# Patient Record
Sex: FEMALE | Race: WHITE
[De-identification: names, ages, dates, MRNs, and addresses within clinical notes are randomized per-mention and may not be internally consistent; named-entity substitution may affect disease eponyms.]

---

## 2017-04-10 ENCOUNTER — HOSPITAL ENCOUNTER (OUTPATIENT)
Dept: HOSPITAL 79 - OPSV | Age: 67
End: 2017-04-10
Attending: FAMILY MEDICINE
Payer: MEDICARE

## 2017-04-10 DIAGNOSIS — D50.9: Primary | ICD-10-CM

## 2017-04-10 LAB
HGB BLD-MCNC: 7.3 GM/DL (ref 12.3–15.3)
RED BLOOD COUNT: 3.65 M/UL (ref 4–5.1)
WHITE BLOOD COUNT: 6.3 K/UL (ref 4.5–11)

## 2017-04-10 PROCEDURE — P9016 RBC LEUKOCYTES REDUCED: HCPCS

## 2017-04-11 ENCOUNTER — HOSPITAL ENCOUNTER (OUTPATIENT)
Dept: HOSPITAL 79 - LAB | Age: 67
End: 2017-04-11
Attending: NURSE PRACTITIONER
Payer: MEDICARE

## 2017-04-11 DIAGNOSIS — D50.9: Primary | ICD-10-CM

## 2017-04-11 LAB
HGB BLD-MCNC: 9.9 GM/DL (ref 12.3–15.3)
RED BLOOD COUNT: 4.44 M/UL (ref 4–5.1)
WHITE BLOOD COUNT: 7.4 K/UL (ref 4.5–11)

## 2017-04-24 ENCOUNTER — TELEPHONE (OUTPATIENT)
Dept: OBSTETRICS AND GYNECOLOGY | Facility: CLINIC | Age: 67
End: 2017-04-24

## 2017-04-24 NOTE — TELEPHONE ENCOUNTER
I think that is the med that she had been using with success in the past.  It makes me wonder if there is something new going on.  I would need to see her to look and see exactly what's going on to better figure out what we can do to help her with her itching.

## 2017-04-24 NOTE — TELEPHONE ENCOUNTER
----- Message from Xi Mcleod sent at 4/24/2017  9:38 AM EDT -----  TRIAMCINOLONE RX THAT WAS GIVEN IS USELESS FOR HER RASH, THE FIRST RX GIVEN WAS NOT COVERED BY HER INSURANCE .  CAN SHE HAVE SOMETHING ELSE THAT IS COVERED AND THAT WILL WORK.       [facility] IN [place]

## 2017-06-14 ENCOUNTER — HOSPITAL ENCOUNTER (OUTPATIENT)
Dept: HOSPITAL 79 - LAB | Age: 67
End: 2017-06-14
Attending: SURGERY
Payer: MEDICARE

## 2017-06-14 DIAGNOSIS — D64.9: Primary | ICD-10-CM

## 2017-06-14 LAB — HGB BLD-MCNC: 9.9 GM/DL (ref 12.3–15.3)

## 2017-11-29 ENCOUNTER — OFFICE VISIT (OUTPATIENT)
Dept: OBSTETRICS AND GYNECOLOGY | Facility: CLINIC | Age: 67
End: 2017-11-29

## 2017-11-29 VITALS
DIASTOLIC BLOOD PRESSURE: 76 MMHG | RESPIRATION RATE: 14 BRPM | SYSTOLIC BLOOD PRESSURE: 130 MMHG | HEIGHT: 61 IN | BODY MASS INDEX: 31.53 KG/M2 | WEIGHT: 167 LBS

## 2017-11-29 DIAGNOSIS — M85.862 OSTEOPENIA OF BOTH LOWER LEGS: ICD-10-CM

## 2017-11-29 DIAGNOSIS — M85.861 OSTEOPENIA OF BOTH LOWER LEGS: ICD-10-CM

## 2017-11-29 DIAGNOSIS — Z01.419 WELL WOMAN EXAM WITH ROUTINE GYNECOLOGICAL EXAM: Primary | ICD-10-CM

## 2017-11-29 PROBLEM — E11.9 DIABETES TYPE 2, CONTROLLED: Status: ACTIVE | Noted: 2017-01-01

## 2017-11-29 PROCEDURE — 99397 PER PM REEVAL EST PAT 65+ YR: CPT | Performed by: OBSTETRICS & GYNECOLOGY

## 2017-11-29 RX ORDER — DOXYCYCLINE HYCLATE 50 MG/1
324 CAPSULE, GELATIN COATED ORAL
COMMUNITY
End: 2019-12-09

## 2017-11-29 RX ORDER — RANITIDINE 150 MG/1
TABLET ORAL
COMMUNITY
Start: 2017-08-31 | End: 2017-11-29

## 2017-11-29 RX ORDER — LANOLIN ALCOHOL/MO/W.PET/CERES
1000 CREAM (GRAM) TOPICAL DAILY
COMMUNITY

## 2017-11-29 RX ORDER — MELATONIN
1000 DAILY
COMMUNITY
End: 2019-12-09
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
## 2017-12-05 ENCOUNTER — APPOINTMENT (OUTPATIENT)
Dept: BONE DENSITY | Facility: HOSPITAL | Age: 67
End: 2017-12-05
Attending: OBSTETRICS & GYNECOLOGY

## 2018-03-07 ENCOUNTER — APPOINTMENT (OUTPATIENT)
Dept: BONE DENSITY | Facility: HOSPITAL | Age: 68
End: 2018-03-07
Attending: OBSTETRICS & GYNECOLOGY

## 2018-04-02 ENCOUNTER — APPOINTMENT (OUTPATIENT)
Dept: BONE DENSITY | Facility: HOSPITAL | Age: 68
End: 2018-04-02
Attending: OBSTETRICS & GYNECOLOGY

## 2018-12-03 ENCOUNTER — OFFICE VISIT (OUTPATIENT)
Dept: OBSTETRICS AND GYNECOLOGY | Facility: CLINIC | Age: 68
End: 2018-12-03

## 2018-12-03 VITALS
DIASTOLIC BLOOD PRESSURE: 76 MMHG | BODY MASS INDEX: 32.88 KG/M2 | WEIGHT: 174 LBS | RESPIRATION RATE: 14 BRPM | SYSTOLIC BLOOD PRESSURE: 128 MMHG

## 2018-12-03 DIAGNOSIS — R15.9 INCONTINENCE OF FECES WITH FECAL URGENCY: ICD-10-CM

## 2018-12-03 DIAGNOSIS — R15.2 INCONTINENCE OF FECES WITH FECAL URGENCY: ICD-10-CM

## 2018-12-03 DIAGNOSIS — M81.0 AGE-RELATED OSTEOPOROSIS WITHOUT CURRENT PATHOLOGICAL FRACTURE: Primary | ICD-10-CM

## 2018-12-03 PROCEDURE — 99214 OFFICE O/P EST MOD 30 MIN: CPT | Performed by: OBSTETRICS & GYNECOLOGY

## 2018-12-03 RX ORDER — LEVOCETIRIZINE DIHYDROCHLORIDE 5 MG/1
5 TABLET, FILM COATED ORAL EVERY EVENING
COMMUNITY
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
## 2019-07-01 ENCOUNTER — OFFICE VISIT (OUTPATIENT)
Dept: OBSTETRICS AND GYNECOLOGY | Facility: CLINIC | Age: 69
End: 2019-07-01

## 2019-07-01 VITALS
DIASTOLIC BLOOD PRESSURE: 80 MMHG | WEIGHT: 178 LBS | SYSTOLIC BLOOD PRESSURE: 132 MMHG | RESPIRATION RATE: 14 BRPM | BODY MASS INDEX: 33.63 KG/M2

## 2019-07-01 DIAGNOSIS — L73.9 FOLLICULITIS: Primary | ICD-10-CM

## 2019-07-01 PROCEDURE — 99214 OFFICE O/P EST MOD 30 MIN: CPT | Performed by: OBSTETRICS & GYNECOLOGY

## 2019-07-01 RX ORDER — SULFAMETHOXAZOLE AND TRIMETHOPRIM 400; 80 MG/1; MG/1
1 TABLET ORAL 2 TIMES DAILY
Qty: 14 TABLET | Refills: 0 | Status: SHIPPED | OUTPATIENT
Start: 2019-07-01 | End: 2019-07-08

## 2019-07-01 NOTE — PROGRESS NOTES
"Subjective   Chief Complaint   Patient presents with   • Breast Problem     spot on left breast      Nely Singleton is a 69 y.o. year old  presenting to be seen because of a new lesion on the undersurface of the left breast.  Its been there for about a week's time.  It is below the nipple.  It is under the breast where the inferior portion of the breast rubs on her abdomen.  There is no drainage from the area.  It feels like there is a \"kernel \"just below the surface of this.    · Last mammogram: was done on approximately 2018 and the result was: Birads II (Benign findings).  · Last breast MRI: she has never had a MRI    OTHER THINGS SHE WANTS TO DISCUSS TODAY:  Nothing else    The following portions of the patient's history were reviewed and updated as appropriate:no additional history reviewed    Social History    Tobacco Use      Smoking status: Never Smoker      Smokeless tobacco: Never Used    Review of Systems  Constitutional POS: nothing reported    NEG: anorexia or night sweats   Genitourinary POS: nothing reported    NEG: dysuria or hematuria   Gastointestinal POS: nothing reported    NEG: bloating, change in bowel habits, melena or reflux symptoms   Integument POS: nothing reported    NEG: moles that are changing in size, shape, color or rashes   Breast POS: see HPI    NEG: persistent breast lump, skin dimpling or nipple discharge        Objective   /80   Resp 14   Wt 80.7 kg (178 lb)   LMP  (LMP Unknown)   Breastfeeding? No   BMI 33.63 kg/m²     General:  well developed; well nourished  no acute distress   Breasts:  Examined in supine position   In the left breast at the 9 o'clock position is an indurated red mass adjacent to the nipple.  It is 1 cm in the vertical dimension and 2 cm in horizontal dimension     Lab Review   No data reviewed    Imaging   Mammogram report       Assessment   1. Left breast lesion.  This is a new finding for which additional work-up might be indicated.  " Differential to include folliculitis with phlegmon; folliculitis with underlying abscess; inflammatory breast cancer     Plan   1. Empiric treatment with antibiotic to cover staph  2. Reassess in 7 to 10 days time.  Unless lesion has fully resolved itself, imaging will be indicated to exclude abscess or underlying breast neoplasia  3. The importance of keeping all planned follow-up and taking all medications as prescribed was emphasized.  4. Follow up for clinical breast exam 1-2 weeks    New Medications Ordered This Visit   Medications   • sulfamethoxazole-trimethoprim (BACTRIM,SEPTRA) 400-80 MG tablet     Sig: Take 1 tablet by mouth 2 (Two) Times a Day for 7 days.     Dispense:  14 tablet     Refill:  0          This note was electronically signed.    Partha Mitchell M.D.  July 1, 2019    Note: Speech recognition transcription software may have been used to create portions of this document.  An attempt at proofreading has been made but errors in transcription could still be present.

## 2019-07-10 ENCOUNTER — OFFICE VISIT (OUTPATIENT)
Dept: OBSTETRICS AND GYNECOLOGY | Facility: CLINIC | Age: 69
End: 2019-07-10

## 2019-07-10 VITALS
SYSTOLIC BLOOD PRESSURE: 136 MMHG | WEIGHT: 179 LBS | RESPIRATION RATE: 14 BRPM | DIASTOLIC BLOOD PRESSURE: 80 MMHG | BODY MASS INDEX: 33.82 KG/M2

## 2019-07-10 DIAGNOSIS — N63.20 LEFT BREAST MASS: Primary | ICD-10-CM

## 2019-07-10 DIAGNOSIS — N64.9 DISORDER OF BREAST: ICD-10-CM

## 2019-07-10 PROCEDURE — 99213 OFFICE O/P EST LOW 20 MIN: CPT | Performed by: OBSTETRICS & GYNECOLOGY

## 2019-07-10 NOTE — PROGRESS NOTES
Subjective   Chief Complaint   Patient presents with   • Breast Problem     f/u     Nely Singleton is a 69 y.o. year old  presenting to be seen because of follow-up from her previous visit.  She is completed the antibiotics and feels there is been not a significant change.      OTHER THINGS SHE WANTS TO DISCUSS TODAY:  Nothing else    The following portions of the patient's history were reviewed and updated as appropriate:no additional history reviewed    Social History    Tobacco Use      Smoking status: Never Smoker      Smokeless tobacco: Never Used    Review of Systems  Constitutional POS: nothing reported    NEG: anorexia or night sweats   Genitourinary POS: nothing reported    NEG: dysuria or hematuria   Gastointestinal POS: nothing reported    NEG: bloating, change in bowel habits, melena or reflux symptoms   Integument POS: nothing reported    NEG: moles that are changing in size, shape, color or rashes   Breast POS: see HPI    NEG: skin dimpling or nipple discharge        Objective   /80   Resp 14   Wt 81.2 kg (179 lb)   LMP  (LMP Unknown)   Breastfeeding? No   BMI 33.82 kg/m²     General:  well developed; well nourished  no acute distress   Breasts:   The left breast has a persistent indurated area approximately 1 to 2 cm in size.  It is at the 9 o'clock position adjacent to the nipple.  The redness overlying it is less noticeable than the prior visit but there is still some erythema     Lab Review   No data reviewed    Imaging   No data reviewed       Assessment   1. Persistent left breast lesion.  Differential diagnosis to include inflammatory breast cancer or underlying breast abscess.  Additional work-up is needed     Plan   1. Set up diagnostic mammogram.  2. Explained in the presence of a normal mammogram additional study with surgical consultation will be required.  Only if this were to spontaneously regress with no additional work-up be needed.  Potential for false negative  mammogram and false negative stereotactic biopsy was explained.  3. The importance of keeping all planned follow-up and taking all medications as prescribed was emphasized.           This note was electronically signed.    Partha Mitchell M.D.  July 10, 2019    Note: Speech recognition transcription software may have been used to create portions of this document.  An attempt at proofreading has been made but errors in transcription could still be present.

## 2019-07-11 ENCOUNTER — TELEPHONE (OUTPATIENT)
Dept: OBSTETRICS AND GYNECOLOGY | Facility: CLINIC | Age: 69
End: 2019-07-11

## 2019-07-18 ENCOUNTER — TELEPHONE (OUTPATIENT)
Dept: OBSTETRICS AND GYNECOLOGY | Facility: CLINIC | Age: 69
End: 2019-07-18

## 2019-07-18 NOTE — TELEPHONE ENCOUNTER
Spoke with  Mani, she has scheduled her ordered mammogram at [facility]. Results will be sent to Dr Mitchell after screening completed.

## 2019-08-14 ENCOUNTER — TELEPHONE (OUTPATIENT)
Dept: OBSTETRICS AND GYNECOLOGY | Facility: CLINIC | Age: 69
End: 2019-08-14

## 2019-08-14 DIAGNOSIS — N64.89 OTHER SPECIFIED DISORDERS OF BREAST: ICD-10-CM

## 2019-08-14 DIAGNOSIS — N63.20 LEFT BREAST MASS: Primary | ICD-10-CM

## 2019-08-14 NOTE — TELEPHONE ENCOUNTER
Received a phone call from mammography at [facility].  Patient is there to have diagnostic mammography performed on the left breast.  Order was written for tomosynthesis.  The only do 2D imaging.  As such tomosynthesis cannot be offered.  Explained that we can begin with 2D imaging diagnostically and depending on how that goes see what happens next.
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
## 2019-12-09 ENCOUNTER — OFFICE VISIT (OUTPATIENT)
Dept: OBSTETRICS AND GYNECOLOGY | Facility: CLINIC | Age: 69
End: 2019-12-09

## 2019-12-09 VITALS
DIASTOLIC BLOOD PRESSURE: 80 MMHG | BODY MASS INDEX: 32.5 KG/M2 | RESPIRATION RATE: 14 BRPM | WEIGHT: 172 LBS | SYSTOLIC BLOOD PRESSURE: 128 MMHG

## 2019-12-09 DIAGNOSIS — Z01.419 WELL WOMAN EXAM WITH ROUTINE GYNECOLOGICAL EXAM: Primary | ICD-10-CM

## 2019-12-09 PROCEDURE — 99397 PER PM REEVAL EST PAT 65+ YR: CPT | Performed by: OBSTETRICS & GYNECOLOGY

## 2019-12-09 RX ORDER — LOSARTAN POTASSIUM 25 MG/1
25 TABLET ORAL DAILY
COMMUNITY

## 2020-12-10 ENCOUNTER — OFFICE VISIT (OUTPATIENT)
Dept: OBSTETRICS AND GYNECOLOGY | Facility: CLINIC | Age: 70
End: 2020-12-10

## 2020-12-10 VITALS
DIASTOLIC BLOOD PRESSURE: 74 MMHG | WEIGHT: 169 LBS | SYSTOLIC BLOOD PRESSURE: 126 MMHG | RESPIRATION RATE: 14 BRPM | BODY MASS INDEX: 31.93 KG/M2

## 2020-12-10 DIAGNOSIS — M81.0 AGE-RELATED OSTEOPOROSIS WITHOUT CURRENT PATHOLOGICAL FRACTURE: Primary | ICD-10-CM

## 2020-12-10 PROCEDURE — 99214 OFFICE O/P EST MOD 30 MIN: CPT | Performed by: OBSTETRICS & GYNECOLOGY

## 2020-12-10 RX ORDER — DOXYCYCLINE HYCLATE 50 MG/1
324 CAPSULE, GELATIN COATED ORAL
COMMUNITY
End: 2023-03-07

## 2020-12-10 NOTE — PROGRESS NOTES
Subjective   Chief Complaint   Patient presents with   • Gynecologic Exam     Nely Singleton is a 70 y.o. year old  Medicare patient presenting to be seen in follow up regarding her bone loss and vulvar itching.  Last year she was on Forteo through her primary care.  Last bone density had been in 2018.  She has been on Forteo coming up on 2 years time now.    This past year she has not been on hormone replacement therapy.  She has not had any vaginal bleeding in the last 12 months.   Menopausal symptoms are not present.    SEXUAL Hx:  She is not currently sexually active.  In the past year there she has not been sexually active.    Condoms are not needed because she is not sexually active.  She would not like to be screened for STD's at today's exam.  [place] is painful: n/a  HEALTH Hx:  She exercises regularly: no (and has no plans to become more active).  She wears her seat belt: yes.  She has concerns about domestic violence: no.  She has noticed changes in height: no  OTHER THINGS SHE WANTS TO DISCUSS TODAY:  Nothing else    The following portions of the patient's history were reviewed and updated as appropriate:problem list, current medications, allergies, past family history, past medical history, past social history and past surgical history.    Social History    Tobacco Use      Smoking status: Never Smoker      Smokeless tobacco: Never Used    Review of Systems  Constitutional POS: nothing reported    NEG: anorexia or night sweats   Genitourinary POS: nothing reported    NEG: dysuria or hematuria   Gastointestinal POS: nothing reported    NEG: bloating, change in bowel habits, melena or reflux symptoms   Integument POS: nothing reported    NEG: moles that are changing in size, shape, color or rashes   Breast POS: nothing reported    NEG: persistent breast lump, skin dimpling or nipple discharge        Objective   /74   Resp 14   Wt 76.7 kg (169 lb)   LMP  (LMP Unknown)   Breastfeeding  No   BMI 31.93 kg/m²     General:  well developed; well nourished  no acute distress   Skin:  No suspicious lesions seen   Thyroid: normal to inspection and palpation   Breasts:  Examined in supine position  Symmetric without masses or skin dimpling  Nipples normal without inversion, lesions or discharge  There are no palpable axillary nodes   Abdomen: soft, non-tender; no masses  no umbilical or inguinal hernias are present  no hepato-splenomegaly   Pelvis: Clinical staff was present for exam  External genitalia:  normal appearance of the external genitalia including Bartholin's and [place]'s glands.  :  urethral meatus normal;  Vaginal:  atrophic mucosal changes are present;  Cervix:  normal appearance.  Uterus:  normal size, shape and consistency.  Adnexa:  non palpable bilaterally.  Rectal:  digital rectal exam not performed; anus visually normal appearing.        Assessment   1. Osteoporosis on Forteo approximately 2 years time.  Follow-up DEXA is due  2. Menopausal female currently not on HRT - without significant symptoms affecting activities of daily living  3. She is up to date on all relevant gynecologic and colorectal screenings except mammography     Plan   1. Pap was not done today.  I explained to Nely that the Pap smears are no longer recommended in patient's after 65 years of age.   I stressed to Nely that she still should be seen to be seen yearly for a full physical including breast and pelvic exam.  2. She was encouraged to get yearly mammograms.  She should report any palpable breast lump(s) or skin changes regardless of mammographic findings.  I explained to Nely that notification regarding her mammogram results will come from the center performing the study.  Our office will not be routinely calling with mammogram results.  It is her responsibility to make sure that the results from the mammogram are communicated to her by the breast center.  If she has any questions about the  results, she is welcome to call our office anytime.  3. The following data needs to be obtained to update her medical records: last mammogram.  4. Assuming follow-up bone density is improving, I think she will need another approximate 8 years of treatment.  Given her past history, best options would either be Prolia or consideration for yearly Reclast.  A copy of these notes will be sent for primary care for consideration  5. Bone density testing was recommended.  Her PCP is setting that up.  I reviewed with Nely that it was always most advisable for all bone density tests for each patient to be done on the same machine over time.  The purpose of this is to improve the accuracy of the interpretation of serial studies.  6. The importance of keeping all planned follow-up and taking all medications as prescribed was emphasized.  7. Follow up for annual exam 1 year         This note was electronically signed.    Partha Mitchell M.D.  December 10, 2020    Note: Speech recognition transcription software may have been used to create portions of this document.  An attempt at proofreading has been made but errors in transcription could still be present.

## 2021-02-09 ENCOUNTER — HOSPITAL ENCOUNTER (OUTPATIENT)
Dept: HOSPITAL 79 - MAMO | Age: 71
End: 2021-02-09
Attending: FAMILY MEDICINE
Payer: MEDICARE

## 2021-02-09 DIAGNOSIS — Z78.0: ICD-10-CM

## 2021-02-09 DIAGNOSIS — Z12.31: Primary | ICD-10-CM

## 2021-08-06 ENCOUNTER — HOSPITAL ENCOUNTER (OUTPATIENT)
Dept: HOSPITAL 79 - OPSV | Age: 71
End: 2021-08-06
Attending: FAMILY MEDICINE
Payer: MEDICARE

## 2021-08-06 VITALS — HEIGHT: 61 IN | WEIGHT: 169 LBS | BODY MASS INDEX: 31.91 KG/M2

## 2021-08-06 DIAGNOSIS — M80.08XA: Primary | ICD-10-CM

## 2022-02-16 ENCOUNTER — HOSPITAL ENCOUNTER (OUTPATIENT)
Dept: HOSPITAL 79 - OPSV | Age: 72
End: 2022-02-16
Attending: FAMILY MEDICINE
Payer: MEDICARE

## 2022-02-16 VITALS — WEIGHT: 165 LBS | HEIGHT: 61 IN | BODY MASS INDEX: 31.15 KG/M2

## 2022-02-16 DIAGNOSIS — M80.08XA: Primary | ICD-10-CM

## 2022-03-01 ENCOUNTER — OFFICE VISIT (OUTPATIENT)
Dept: OBSTETRICS AND GYNECOLOGY | Facility: CLINIC | Age: 72
End: 2022-03-01

## 2022-03-01 VITALS
SYSTOLIC BLOOD PRESSURE: 122 MMHG | RESPIRATION RATE: 14 BRPM | DIASTOLIC BLOOD PRESSURE: 76 MMHG | BODY MASS INDEX: 31.93 KG/M2 | WEIGHT: 169 LBS

## 2022-03-01 DIAGNOSIS — L29.2 VULVAR ITCHING: ICD-10-CM

## 2022-03-01 DIAGNOSIS — Z01.419 WELL WOMAN EXAM WITH ROUTINE GYNECOLOGICAL EXAM: Primary | ICD-10-CM

## 2022-03-01 DIAGNOSIS — M81.0 AGE-RELATED OSTEOPOROSIS WITHOUT CURRENT PATHOLOGICAL FRACTURE: ICD-10-CM

## 2022-03-01 PROCEDURE — 99397 PER PM REEVAL EST PAT 65+ YR: CPT | Performed by: OBSTETRICS & GYNECOLOGY

## 2022-03-01 RX ORDER — DENOSUMAB 60 MG/ML
60 INJECTION SUBCUTANEOUS
Qty: 1 ML | Refills: 1
Start: 2022-03-01

## 2022-03-01 NOTE — PROGRESS NOTES
Subjective   Chief Complaint   Patient presents with   • Gynecologic Exam     Nely Singleton is a 72 y.o. year old  menopausal female presenting to be seen for her annual exam and f/u on her osteoporosis.  She has finished her Forteo and subsequent has been placed on Prolia.  She has had at least 2 doses of that.  She thinks she may have had a bone density done since her 2018 1 last documented here.    She is having her knee replaced in the coming months done in [place], [place].    This past year she has not been on hormone replacement therapy.  She has not had any vaginal bleeding in the last 12 months.  Menopausal symptoms are not present.    SEXUAL Hx:  She is not currently sexually active.  In the past year there she has not been sexually active.    Condoms are not needed because she is not sexually active.  She would not like to be screened for STD's at today's exam.  [place] is painful: n/a  HEALTH Hx:  She exercises regularly: no (and has no plans to become more active).  She wears her seat belt: yes.  She has concerns about domestic violence: no.  She has noticed changes in height: no.  OTHER THINGS SHE WANTS TO DISCUSS TODAY:  Nothing else    The following portions of the patient's history were reviewed and updated as appropriate:problem list, current medications, allergies, past family history, past medical history, past social history and past surgical history.    Social History    Tobacco Use      Smoking status: Never Smoker      Smokeless tobacco: Never Used      Review of Systems  Constitutional POS: nothing reported    NEG: anorexia or night sweats   Genitourinary POS: nothing reported    NEG: dysuria or hematuria      Gastointestinal POS: nothing reported and had colonoscopy in the past 5 years - results are not in record for review    NEG: bloating, change in bowel habits, melena or reflux symptoms   Integument POS: nothing reported    NEG: moles that are changing in size, shape, color  or rashes   Breast POS: nothing reported and had normal mammogram in the past 2 years - results are not in record for review    NEG: persistent breast lump, skin dimpling or nipple discharge        Objective   /76   Resp 14   Wt 76.7 kg (169 lb)   LMP  (LMP Unknown)   Breastfeeding No   BMI 31.93 kg/m²     General:  well developed; well nourished  no acute distress   Skin:  No suspicious lesions seen   Thyroid: normal to inspection and palpation   Breasts:  Examined in supine position  Symmetric without masses or skin dimpling  Nipples normal without inversion, lesions or discharge  There are no palpable axillary nodes   Abdomen: soft, non-tender; no masses  no umbilical or inguinal hernias are present  no hepato-splenomegaly   Pelvis: Clinical staff was present for exam  External genitalia:  normal appearance of the external genitalia including Bartholin's and [place]'s glands.  The labia majora are shiny and pink/reddish  :  urethral meatus normal;  Vaginal:  normal pink mucosa without prolapse or lesions.  Cervix:  normal appearance.  Uterus:  normal size, shape and consistency.  Adnexa:  normal bimanual exam of the adnexa.  Rectal:  digital rectal exam not performed; anus visually normal appearing.        Assessment   1. Normal GYN exam in menopause  2. Osteoporosis -being managed by primary care.  Currently on Prolia  3. Vulvar pruritus with probable atopic dermatitis  4. Menopausal female currently not on HRT - without significant symptoms affecting activities of daily living  5. She is up to date on all relevant gynecologic and colorectal screenings     Plan   1. Pap was not done today.  I explained to Nely that the Pap smears are no longer recommended in patient's after 65 years of age.   I stressed to Nely that she still should be seen to be seen yearly for a full physical including breast and pelvic exam.  2. She was encouraged to get yearly mammograms.  She should report any palpable  breast lump(s) or skin changes regardless of mammographic findings.  I explained to Nely that notification regarding her mammogram results will come from the center performing the study.  Our office will not be routinely calling with mammogram results.  It is her responsibility to make sure that the results from the mammogram are communicated to her by the breast center.  If she has any questions about the results, she is welcome to call our office anytime.  3. The following data needs to be obtained to update her medical records: last mammogram, last DEXA and last colonoscopy.  4. Her vaccine record was reviewed and updated.  5. I discussed with Nely that she may be behind on needed vaccinations for Influenza, Shingles [Shingrix] and COVID.  She may be able to obtain these vaccinations at her local pharmacy OR speak about obtaining them with her primary care.  If she does obtain her vaccines, I have asked Nely to let us know the date each vaccine was obtained so that her medical record could be updated in our system.  6. I explained to Nely that vulvar itching often is due to a contact dermatitis.  The source of this often results from products that directly contact the vulvar skin or residues from cleaning products used to launder clothing.  Any product that has a fragrance or oil the directly contacts the skin or comes in contact with the skin through clothing can be the inciting factor.  I encouraged her to examine all products used for cleansing and to move to hypoallergenic, unscented products.  If this fails to control her symptoms, additional testing for things such as food allergies may be warranted.  7. The importance of keeping all planned follow-up and taking all medications as prescribed was emphasized.  8. Follow up for annual exam 1 year    New Medications Ordered This Visit   Medications   • triamcinolone (KENALOG) 0.1 % ointment     Sig: Apply  topically to the appropriate area as  directed 2 (Two) Times a Day.     Dispense:  80 g     Refill:  3     Please consider 90 day supplies to promote better adherence          This note was electronically signed.    Partha Mitchell M.D.  March 1, 2022    Part of this note may be an electronic transcription/translation of spoken language to printed text using the Dragon Dictation System.

## 2022-03-24 ENCOUNTER — HOSPITAL ENCOUNTER (OUTPATIENT)
Dept: HOSPITAL 79 - OPSV2 | Age: 72
End: 2022-03-24
Attending: ORTHOPAEDIC SURGERY
Payer: MEDICARE

## 2022-03-24 DIAGNOSIS — E11.9: ICD-10-CM

## 2022-03-24 DIAGNOSIS — Z01.818: Primary | ICD-10-CM

## 2022-03-24 DIAGNOSIS — R94.31: ICD-10-CM

## 2022-03-24 DIAGNOSIS — M17.12: ICD-10-CM

## 2022-03-24 LAB
BUN/CREATININE RATIO: 17 (ref 0–10)
HGB BLD-MCNC: 12.4 GM/DL (ref 12.3–15.3)
RED BLOOD COUNT: 4.15 M/UL (ref 4–5.1)
WHITE BLOOD COUNT: 7.2 K/UL (ref 4.5–11)

## 2022-04-06 ENCOUNTER — HOSPITAL ENCOUNTER (OUTPATIENT)
Dept: HOSPITAL 79 - LAB | Age: 72
End: 2022-04-06
Attending: ORTHOPAEDIC SURGERY
Payer: MEDICARE

## 2022-04-06 DIAGNOSIS — Z01.812: Primary | ICD-10-CM

## 2022-04-06 LAB — BUN/CREATININE RATIO: 17 (ref 0–10)

## 2022-04-25 ENCOUNTER — HOSPITAL ENCOUNTER (OUTPATIENT)
Dept: HOSPITAL 79 - KOH-I | Age: 72
End: 2022-04-25
Payer: MEDICARE

## 2022-04-25 DIAGNOSIS — R22.42: Primary | ICD-10-CM

## 2022-06-30 ENCOUNTER — HOSPITAL ENCOUNTER (OUTPATIENT)
Dept: HOSPITAL 79 - MAMO | Age: 72
End: 2022-06-30
Attending: NURSE PRACTITIONER
Payer: MEDICARE

## 2022-06-30 DIAGNOSIS — Z78.0: ICD-10-CM

## 2022-06-30 DIAGNOSIS — Z12.31: Primary | ICD-10-CM

## 2022-08-11 ENCOUNTER — HOSPITAL ENCOUNTER (OUTPATIENT)
Dept: HOSPITAL 79 - LAB | Age: 72
End: 2022-08-11
Attending: INTERNAL MEDICINE
Payer: MEDICARE

## 2022-08-11 DIAGNOSIS — D64.9: Primary | ICD-10-CM

## 2022-08-11 DIAGNOSIS — K90.9: ICD-10-CM

## 2022-08-11 LAB
BUN/CREATININE RATIO: 17 (ref 0–10)
HGB BLD-MCNC: 8.7 GM/DL (ref 12.3–15.3)
RED BLOOD COUNT: 3.45 M/UL (ref 4–5.1)
WHITE BLOOD COUNT: 7.2 K/UL (ref 4.5–11)

## 2022-08-12 LAB
HAPTOGLOBIN: 146 MG/DL (ref 42–346)
HEMATOCRIT: 27.9 % (ref 34–46.6)

## 2022-08-24 ENCOUNTER — HOSPITAL ENCOUNTER (OUTPATIENT)
Dept: HOSPITAL 79 - OPSV | Age: 72
End: 2022-08-24
Attending: FAMILY MEDICINE
Payer: MEDICARE

## 2022-08-24 DIAGNOSIS — M80.08XA: Primary | ICD-10-CM

## 2022-08-31 ENCOUNTER — HOSPITAL ENCOUNTER (OUTPATIENT)
Dept: HOSPITAL 79 - OPSV | Age: 72
End: 2022-08-31
Attending: INTERNAL MEDICINE
Payer: MEDICARE

## 2022-08-31 VITALS — HEIGHT: 62 IN | BODY MASS INDEX: 30 KG/M2 | WEIGHT: 163 LBS

## 2022-08-31 DIAGNOSIS — K90.9: Primary | ICD-10-CM

## 2022-09-16 ENCOUNTER — HOSPITAL ENCOUNTER (OUTPATIENT)
Dept: HOSPITAL 79 - OPSV | Age: 72
End: 2022-09-16
Attending: FAMILY MEDICINE
Payer: MEDICARE

## 2022-09-16 VITALS — HEIGHT: 62 IN | BODY MASS INDEX: 30 KG/M2 | WEIGHT: 163 LBS

## 2022-09-16 DIAGNOSIS — D50.9: Primary | ICD-10-CM

## 2022-09-21 ENCOUNTER — HOSPITAL ENCOUNTER (OUTPATIENT)
Dept: HOSPITAL 79 - RAD | Age: 72
End: 2022-09-21
Attending: NURSE PRACTITIONER
Payer: MEDICARE

## 2022-09-21 DIAGNOSIS — J20.9: Primary | ICD-10-CM

## 2022-09-22 ENCOUNTER — HOSPITAL ENCOUNTER (OUTPATIENT)
Dept: HOSPITAL 79 - OPSV | Age: 72
End: 2022-09-22
Attending: FAMILY MEDICINE
Payer: MEDICARE

## 2022-09-22 VITALS — WEIGHT: 163 LBS | BODY MASS INDEX: 30 KG/M2 | HEIGHT: 62 IN

## 2022-09-22 DIAGNOSIS — K90.9: ICD-10-CM

## 2022-09-22 DIAGNOSIS — D50.9: Primary | ICD-10-CM

## 2022-09-28 ENCOUNTER — HOSPITAL ENCOUNTER (OUTPATIENT)
Dept: HOSPITAL 79 - OPSV | Age: 72
End: 2022-09-28
Attending: FAMILY MEDICINE
Payer: MEDICARE

## 2022-09-28 VITALS — HEIGHT: 62 IN | BODY MASS INDEX: 30 KG/M2 | WEIGHT: 163 LBS

## 2022-09-28 DIAGNOSIS — K90.9: ICD-10-CM

## 2022-09-28 DIAGNOSIS — D50.9: Primary | ICD-10-CM

## 2023-03-07 ENCOUNTER — OFFICE VISIT (OUTPATIENT)
Dept: OBSTETRICS AND GYNECOLOGY | Facility: CLINIC | Age: 73
End: 2023-03-07
Payer: MEDICARE

## 2023-03-07 VITALS
SYSTOLIC BLOOD PRESSURE: 128 MMHG | WEIGHT: 161 LBS | RESPIRATION RATE: 14 BRPM | BODY MASS INDEX: 30.42 KG/M2 | DIASTOLIC BLOOD PRESSURE: 76 MMHG

## 2023-03-07 DIAGNOSIS — M81.0 AGE-RELATED OSTEOPOROSIS WITHOUT CURRENT PATHOLOGICAL FRACTURE: ICD-10-CM

## 2023-03-07 DIAGNOSIS — Z71.85 VACCINE COUNSELING: ICD-10-CM

## 2023-03-07 DIAGNOSIS — L29.2 VULVAR ITCHING: Primary | ICD-10-CM

## 2023-03-07 DIAGNOSIS — D50.0 IRON DEFICIENCY ANEMIA DUE TO CHRONIC BLOOD LOSS: ICD-10-CM

## 2023-03-07 PROCEDURE — 99214 OFFICE O/P EST MOD 30 MIN: CPT | Performed by: OBSTETRICS & GYNECOLOGY

## 2023-03-07 RX ORDER — ECHINACEA PURPUREA EXTRACT 125 MG
1 TABLET ORAL
COMMUNITY

## 2023-03-07 RX ORDER — FAMOTIDINE 20 MG/1
20 TABLET, FILM COATED ORAL
COMMUNITY

## 2023-03-07 NOTE — PATIENT INSTRUCTIONS
Zoster Vaccine, Recombinant injection (Shingrix)      What is this medicine?  ZOSTER VACCINE (ZOS ter vak SEEN) is used to prevent shingles in adults 50 years old and over. This vaccine is not used to treat shingles or nerve pain from shingles.  This medicine may be used for other purposes; ask your health care provider or pharmacist if you have questions.    What should I tell my health care provider before I take this medicine?  They need to know if you have any of these conditions:  blood disorders or disease  cancer like leukemia or lymphoma  immune system problems or therapy  an unusual or allergic reaction to vaccines, other medications, foods, dyes, or preservatives  pregnant or trying to get pregnant  breast-feeding    How should I use this medicine?  This vaccine is for injection in a muscle. It is given by a health care professional.  The vaccine series requires 2 doses for full effect  The second dose should be given somewhere between 2-6 months after the initial injection is given.    What if I miss a dose?  Keep appointments for follow-up (booster) doses as directed. It is important not to miss your dose.   Call your doctor or health care professional if you are unable to keep an appointment.    What may interact with this medicine?  medicines that suppress your immune system  medicines to treat cancer  steroid medicines like prednisone or cortisone    This list may not describe all possible interactions. Give your health care provider a list of all the medicines, herbs, non-prescription drugs, or dietary supplements you use. Also tell them if you smoke, drink alcohol, or use illegal drugs. Some items may interact with your medicine.    What should I watch for while using this medicine?  Visit your doctor for regular check ups.  This vaccine, like all vaccines, may not fully protect everyone.    What side effects may I notice from receiving this medicine?  Side effects that you should report to your  doctor or health care professional as soon as possible:  allergic reactions like skin rash, itching or hives, swelling of the face, lips, or tongue  breathing problems  Side effects that usually do not require medical attention (report these to your doctor or health care professional if they continue or are bothersome):  chills  headache  fever  nausea, vomiting  redness, warmth, pain, swelling or itching at site where injected  tiredness  This list may not describe all possible side effects. Call your doctor for medical advice about side effects. You may report side effects to FDA at 8-312-FDA-0869.    Where should I keep my medicine?  This vaccine is only given in a clinic, pharmacy, doctor's office, or other health care setting and will not be stored at home.  NOTE: This sheet is a summary. It may not cover all possible information. If you have questions about this medicine, talk to your doctor, pharmacist, or health care provider.  © 2019 [employer] (2018-07-30 13:20:30)

## 2023-03-07 NOTE — PROGRESS NOTES
Subjective   Chief Complaint   Patient presents with   • Gynecologic Exam     Nely Singleton is a 73 y.o. year old  Medicare patient presenting to be seen in follow up regarding her osteoporosis.  Historically it was managed by her primary care.  Last year she was on Prolia having finished Forteo.  She remains on the Prolia prescribed by primary care.  She thinks its been about 2 years since her last bone density was done.    She was recently admitted after a slip and fall.  Had a black eye and had no significant orthopedic injuries.  She was diagnosed as being markedly anemic with a hemoglobin of 5.  Was given 2 units of blood and also several IV iron transfusions were either given or planned to be given.  She has not yet reached out to her primary care about this.  No other work-up has been initiated thus far.  They are planning on having an EGD and colonoscopy set up down in the [facility].  She is seeming hematologist already for this problem.    Additionally last year she was having issues with vulvar pruritus.  Mid potency steroid cream was prescribed and triggers were reviewed.  Since that time, she still has itching even with the use of steroid creams.  She has not changed her laundry detergent but everything else she has used unscented products.  She still uses a scented laundry detergent.    This past year she has not been on hormone replacement therapy.  She has not had any vaginal bleeding in the last 12 months.   Menopausal symptoms are not present.    SEXUAL Hx:  She is not currently sexually active.  In the past year there she has not been sexually active.    Condoms are not needed because she is not sexually active.  She would not like to be screened for STD's at today's exam.  [place] is painful: n/a  HEALTH Hx:  She exercises regularly: yes.  She wears her seat belt: yes.  She has concerns about domestic violence: no.  She has noticed changes in height: no  OTHER  THINGS SHE WANTS TO DISCUSS TODAY:  Nothing else    The following portions of the patient's history were reviewed and updated as appropriate:problem list, current medications, allergies, past family history, past medical history, past social history and past surgical history.    Social History    Tobacco Use      Smoking status: Never      Smokeless tobacco: Never    Review of Systems  Constitutional POS: nothing reported    NEG: anorexia or night sweats   Genitourinary POS: nothing reported    NEG: dysuria or hematuria   Gastointestinal POS: nothing reported    NEG: bloating, change in bowel habits, melena or reflux symptoms   Integument POS: nothing reported    NEG: moles that are changing in size, shape, color or rashes   Breast POS: nothing reported and had normal mammogram in the past 2 years - results are not in record for review    NEG: persistent breast lump, skin dimpling or nipple discharge        Objective   /76   Resp 14   Wt 73 kg (161 lb)   LMP  (LMP Unknown)   BMI 30.42 kg/m²     General:  well developed; well nourished  no acute distress   Skin:  No suspicious lesions seen   Thyroid: normal to inspection and palpation   Breasts:  Examined in supine position  Symmetric without masses or skin dimpling  Nipples normal without inversion, lesions or discharge  There are no palpable axillary nodes   Abdomen: soft, non-tender; no masses  no umbilical or inguinal hernias are present  no hepato-splenomegaly   Pelvis: Clinical staff was present for exam  External genitalia:  Labia majora are reddened and excoriated.  No evidence of acetowhite epithelium, open sores or anything else  :  urethral meatus normal;  Vaginal:  normal pink mucosa without prolapse or lesions.  Cervix:  normal appearance.  Uterus:  not palpable.  Adnexa:  non palpable bilaterally.  Rectal:  digital rectal exam not performed; anus visually normal appearing.        Assessment   1. History of osteoporosis followed by primary  care historically  2. History of vulvar pruritus related to atopic dermatitis  3. Anemia of unclear etiology.  Anticipate colorectal etiology given her prior history of angiodysplasia of colon in 2017.  Hematology is following already with planned EGD and colonoscopy.  4. Normal GYN exam in menopause  5. Menopausal female currently not on HRT - without significant symptoms affecting activities of daily living  6. She is up to date on all relevant gynecologic and colorectal screenings     Plan   1. Pap was not done today.  I explained to Nely that the Pap smears are no longer recommended in patient's after 65 years of age.   I stressed to Nely that she still should be seen to be seen yearly for a full physical including breast and pelvic exam.  2. She was encouraged to get yearly mammograms.  She should report any palpable breast lump(s) or skin changes regardless of mammographic findings.  I explained to Nely that notification regarding her mammogram results will come from the center performing the study.  Our office will not be routinely calling with mammogram results.  It is her responsibility to make sure that the results from the mammogram are communicated to her by the breast center.  If she has any questions about the results, she is welcome to call our office anytime.  3. The following data needs to be obtained to update her medical records: last mammogram.  4. I explained to Nely that vulvar itching often is due to a contact dermatitis.  The source of this often results from products that directly contact the vulvar skin or residues from cleaning products used to launder clothing.  Any product that has a fragrance or oil the directly contacts the skin or comes in contact with the skin through clothing can be the inciting factor.  I encouraged her to examine all products used for cleansing and to move to hypoallergenic, unscented products.  If this fails to control her symptoms, additional testing  for things such as food allergies may be warranted.  5. If the EGD and colonoscopy are done and are normal, next step might be bone marrow biopsy at the discretion of hematology  6. Her vaccine record was reviewed and updated.  7. I discussed with Nely that she may be behind on needed vaccinations for Influenza, Shingles [Shingrix] and COVID.  She may be able to obtain these vaccinations at her local pharmacy OR speak about obtaining them with her primary care.  If she does obtain her vaccines, I have asked Nely to let us know the date each vaccine was obtained so that her medical record could be updated in our system.  8. The importance of keeping all planned follow-up and taking all medications as prescribed was emphasized.  9. Follow up for annual exam 1 year    New Medications Ordered This Visit   Medications   • triamcinolone (KENALOG) 0.1 % ointment     Sig: Apply  topically to the appropriate area as directed 2 (Two) Times a Day.     Dispense:  80 g     Refill:  3     Please consider 90 day supplies to promote better adherence          This note was electronically signed.    Partha Mitchell M.D.  March 7, 2023    Part of this note may be an electronic transcription/translation of spoken language to printed text using the Dragon Dictation System.

## 2024-03-29 ENCOUNTER — OFFICE VISIT (OUTPATIENT)
Dept: OBSTETRICS AND GYNECOLOGY | Facility: CLINIC | Age: 74
End: 2024-03-29
Payer: MEDICARE

## 2024-03-29 VITALS
RESPIRATION RATE: 14 BRPM | WEIGHT: 158 LBS | DIASTOLIC BLOOD PRESSURE: 78 MMHG | BODY MASS INDEX: 29.85 KG/M2 | SYSTOLIC BLOOD PRESSURE: 132 MMHG

## 2024-03-29 DIAGNOSIS — Z71.85 VACCINE COUNSELING: ICD-10-CM

## 2024-03-29 DIAGNOSIS — Z01.419 WELL WOMAN EXAM WITH ROUTINE GYNECOLOGICAL EXAM: Primary | ICD-10-CM

## 2024-03-29 DIAGNOSIS — M81.0 AGE-RELATED OSTEOPOROSIS WITHOUT CURRENT PATHOLOGICAL FRACTURE: ICD-10-CM

## 2024-03-29 RX ORDER — PANTOPRAZOLE SODIUM 40 MG/1
40 TABLET, DELAYED RELEASE ORAL DAILY
COMMUNITY
Start: 2024-03-19

## 2024-03-29 RX ORDER — CYCLOBENZAPRINE HCL 5 MG
5-10 TABLET ORAL
COMMUNITY
Start: 2024-03-15

## 2024-03-29 RX ORDER — TRIAMCINOLONE ACETONIDE 1 MG/G
OINTMENT TOPICAL 2 TIMES DAILY
Qty: 80 G | Refills: 3 | Status: SHIPPED | OUTPATIENT
Start: 2024-03-29

## 2024-03-29 RX ORDER — PREDNISONE 50 MG/1
TABLET ORAL
COMMUNITY
Start: 2024-03-15

## 2024-03-29 RX ORDER — TRAZODONE HYDROCHLORIDE 50 MG/1
50 TABLET ORAL
COMMUNITY
Start: 2023-12-29

## 2024-03-29 NOTE — PATIENT INSTRUCTIONS
Zoster Vaccine, Recombinant injection (Shingrix)      What is this medicine?  ZOSTER VACCINE (ZOS ter vak SEEN) is used to prevent shingles in adults 50 years old and over. This vaccine is not used to treat shingles or nerve pain from shingles.  This medicine may be used for other purposes; ask your health care provider or pharmacist if you have questions.    What should I tell my health care provider before I take this medicine?  They need to know if you have any of these conditions:  blood disorders or disease  cancer like leukemia or lymphoma  immune system problems or therapy  an unusual or allergic reaction to vaccines, other medications, foods, dyes, or preservatives  pregnant or trying to get pregnant  breast-feeding    How should I use this medicine?  This vaccine is for injection in a muscle. It is given by a health care professional.  The vaccine series requires 2 doses for full effect  The second dose should be given somewhere between 2-6 months after the initial injection is given.    What if I miss a dose?  Keep appointments for follow-up (booster) doses as directed. It is important not to miss your dose.   Call your doctor or health care professional if you are unable to keep an appointment.    What may interact with this medicine?  medicines that suppress your immune system  medicines to treat cancer  steroid medicines like prednisone or cortisone    This list may not describe all possible interactions. Give your health care provider a list of all the medicines, herbs, non-prescription drugs, or dietary supplements you use. Also tell them if you smoke, drink alcohol, or use illegal drugs. Some items may interact with your medicine.    What should I watch for while using this medicine?  Visit your doctor for regular check ups.  This vaccine, like all vaccines, may not fully protect everyone.    What side effects may I notice from receiving this medicine?  Side effects that you should report to your  doctor or health care professional as soon as possible:  allergic reactions like skin rash, itching or hives, swelling of the face, lips, or tongue  breathing problems  Side effects that usually do not require medical attention (report these to your doctor or health care professional if they continue or are bothersome):  chills  headache  fever  nausea, vomiting  redness, warmth, pain, swelling or itching at site where injected  tiredness  This list may not describe all possible side effects. Call your doctor for medical advice about side effects. You may report side effects to FDA at 5-697-FDA-4428.    Where should I keep my medicine?  This vaccine is only given in a clinic, pharmacy, doctor's office, or other health care setting and will not be stored at home.  NOTE: This sheet is a summary. It may not cover all possible information. If you have questions about this medicine, talk to your doctor, pharmacist, or health care provider.  © 2019 [employer]/Gold [employer] (2018-07-30 13:20:30)

## 2024-03-29 NOTE — PROGRESS NOTES
Subjective   Chief Complaint   Patient presents with    Gynecologic Exam     Nely Singleton is a 74 y.o. year old  menopausal female presenting to be seen for her annual exam.  Continues to have ongoing issues with anemia thought to be related to a GI bleed.  Recently was given iron transfusions.  She also had a colonoscopy done within the last couple years at [facility].  Those results are not available.  Those did not find any structural lesions.    Last year she had some itching and steroid cream was prescribed.  The itching is much better.  Uses the triamcinolone 2-3 times a week typically.    This past year she has not been on hormone replacement therapy.  She has not had any vaginal bleeding in the last 12 months.  Menopausal symptoms are not present.    SEXUAL Hx:  She is not currently sexually active.  In the past year there she has not been sexually active.    Condoms are not needed because she is not sexually active.  She would not like to be screened for STD's at today's exam.  [place] is painful: n/a  HEALTH Hx:  She exercises regularly: yes.  She wears her seat belt: yes.  She has concerns about domestic violence: no.  She has noticed changes in height: no.    The following portions of the patient's history were reviewed and updated as appropriate:problem list, current medications, allergies, past family history, past medical history, past social history, and past surgical history.    Social History    Tobacco Use      Smoking status: Never      Smokeless tobacco: Never      Review of Systems  Constitutional POS: nothing reported    NEG: anorexia or night sweats   Genitourinary POS: nothing reported    NEG: dysuria or hematuria      Gastointestinal POS: nothing reported    NEG: bloating, change in bowel habits, melena, or reflux symptoms   Integument POS: nothing reported    NEG: moles that are changing in size, shape, color or rashes   Breast POS: nothing reported    NEG: persistent  breast lump, skin dimpling, or nipple discharge        Objective   /78   Resp 14   Wt 71.7 kg (158 lb)   LMP  (LMP Unknown)   BMI 29.85 kg/m²     General:  well developed; well nourished  no acute distress   Skin:  No suspicious lesions seen   Thyroid: normal to inspection and palpation   Breasts:  Examined in supine position  Symmetric without masses or skin dimpling  Nipples normal without inversion, lesions or discharge  There are no palpable axillary nodes   Abdomen: soft, non-tender; no masses  no umbilical or inguinal hernias are present  no hepato-splenomegaly   Pelvis: Clinical staff was present for exam  External genitalia:  normal appearance of the external genitalia including Barth[place]'s and [place]'s glands.  The labium majora have a plaque white area with no erythema seen at last year's visit is no longer present  :  urethral meatus normal;  Vaginal:  normal pink mucosa without prolapse or lesions.  Cervix:  normal appearance.  Uterus:  normal size, shape and consistency.  Adnexa:  non palpable bilaterally.  Rectal:  digital rectal exam not performed; anus visually normal appearing.        Assessment   Normal GYN exam in menopause  History of vulvar pruritus stable with medicine  Osteoporosis followed by primary care and on Prolia.  Menopausal female currently not on HRT - without significant symptoms affecting activities of daily living  She is up to date on all relevant gynecologic and colorectal screenings       Plan   Pap was not done today.  I explained to Nely that the Pap smears are no longer recommended in patient's after 65 years of age.   I stressed to Nely that she still should be seen to be seen yearly for a full physical including breast and pelvic exam.  She was encouraged to get yearly mammograms.  She should report any palpable breast lump(s) or skin changes regardless of mammographic findings.  I explained to Nely that notification regarding her mammogram results will  come from the center performing the study.  Our office will not be routinely calling with mammogram results.  It is her responsibility to make sure that the results from the mammogram are communicated to her by the breast center.  If she has any questions about the results, she is welcome to call our office anytime.  The following data needs to be obtained to update her medical records: last colonoscopy.  I have counseled the patient on the importance of staying up to date with preventive vaccines as well as the risks and benefits of these vaccines.  Her vaccine record was reviewed and updated.  I discussed with Nely that she may be behind on needed vaccinations for Influenza and Shingles [Shingrix].  She may be able to obtain these vaccinations at her local pharmacy OR speak about obtaining them with her primary care.  If she does obtain her vaccines, I have asked Nely to let us know the date each vaccine was obtained so that her medical record could be updated in our system.  The importance of keeping all planned follow-up and taking all medications as prescribed was emphasized.  Follow up for annual exam 1 year    No orders of the defined types were placed in this encounter.         This note was electronically signed.    Partha Mitchell M.D.  March 29, 2024    Part of this note may be an electronic transcription/translation of spoken language to printed text using the Dragon Dictation System.

## 2025-04-10 ENCOUNTER — OFFICE VISIT (OUTPATIENT)
Dept: OBSTETRICS AND GYNECOLOGY | Facility: CLINIC | Age: 75
End: 2025-04-10
Payer: MEDICARE

## 2025-04-10 VITALS
RESPIRATION RATE: 14 BRPM | BODY MASS INDEX: 29.1 KG/M2 | SYSTOLIC BLOOD PRESSURE: 128 MMHG | WEIGHT: 154 LBS | DIASTOLIC BLOOD PRESSURE: 76 MMHG

## 2025-04-10 DIAGNOSIS — L29.2 VULVAR ITCHING: Primary | ICD-10-CM

## 2025-04-10 DIAGNOSIS — Z71.85 VACCINE COUNSELING: ICD-10-CM

## 2025-04-10 DIAGNOSIS — M81.0 AGE-RELATED OSTEOPOROSIS WITHOUT CURRENT PATHOLOGICAL FRACTURE: ICD-10-CM

## 2025-04-10 PROBLEM — I10 ESSENTIAL HYPERTENSION: Status: ACTIVE | Noted: 2022-01-01

## 2025-04-10 PROBLEM — E78.00 HIGH CHOLESTEROL: Status: ACTIVE | Noted: 2024-01-01

## 2025-04-10 PROCEDURE — 3078F DIAST BP <80 MM HG: CPT | Performed by: OBSTETRICS & GYNECOLOGY

## 2025-04-10 PROCEDURE — 3074F SYST BP LT 130 MM HG: CPT | Performed by: OBSTETRICS & GYNECOLOGY

## 2025-04-10 PROCEDURE — 99214 OFFICE O/P EST MOD 30 MIN: CPT | Performed by: OBSTETRICS & GYNECOLOGY

## 2025-04-10 RX ORDER — TRIAMCINOLONE ACETONIDE 1 MG/G
OINTMENT TOPICAL 2 TIMES DAILY
Qty: 80 G | Refills: 3 | Status: SHIPPED | OUTPATIENT
Start: 2025-04-10

## 2025-04-10 RX ORDER — ROSUVASTATIN CALCIUM 5 MG/1
5 TABLET, COATED ORAL DAILY
COMMUNITY
Start: 2025-01-28

## 2025-04-10 NOTE — PATIENT INSTRUCTIONS
Tdap Vaccine (Tetanus, Diphtheria and Pertussis): What You Need to Know      1. Why get vaccinated?  Tetanus, diphtheria and pertussis are very serious diseases. Tdap vaccine can protect us from these diseases. And, Tdap vaccine given to pregnant women can protect  babies against pertussis..  TETANUS (Lockjaw) is rare in the [place] today. It causes painful muscle tightening and stiffness, usually all over the body.  It can lead to tightening of muscles in the head and neck so you can't open your mouth, swallow, or sometimes even breathe. Tetanus kills about 1 out of 10 people who are infected even after receiving the best medical care.  DIPHTHERIA is also rare in the [place] today. It can cause a thick coating to form in the back of the throat.  It can lead to breathing problems, heart failure, paralysis, and death.  PERTUSSIS (Whooping Cough) causes severe coughing spells, which can cause difficulty breathing, vomiting and disturbed sleep.  It can also lead to weight loss, incontinence, and rib fractures. Up to 2 in 100 adolescents and 5 in 100 adults with pertussis are hospitalized or have complications, which could include pneumonia or death.    These diseases are caused by bacteria. Diphtheria and pertussis are spread from person to person through secretions from coughing or sneezing. Tetanus enters the body through cuts, scratches, or wounds.  Before vaccines, as many as 200,000 cases of diphtheria, 200,000 cases of pertussis, and hundreds of cases of tetanus, were reported in the [place] each year. Since vaccination began, reports of cases for tetanus and diphtheria have dropped by about 99% and for pertussis by about 80%.    2. Tdap vaccine  Tdap vaccine can protect adolescents and adults from tetanus, diphtheria, and pertussis. One dose of Tdap is routinely given at age 11 or 12. People who did not get Tdap at that age should get it as soon as possible.  Tdap is especially important  for healthcare professionals and anyone having close contact with a baby younger than 12 months.  Pregnant women should get a dose of Tdap during every pregnancy, to protect the  from pertussis. Infants are most at risk for severe, life-threatening complications from pertussis.  Another vaccine, called Td, protects against tetanus and diphtheria, but not pertussis. A Td booster should be given every 10 years. Tdap may be given as one of these boosters if you have never gotten Tdap before. Tdap may also be given after a severe cut or burn to prevent tetanus infection.  Your doctor or the person giving you the vaccine can give you more information.  Tdap may safely be given at the same time as other vaccines.    3. Some people should not get this vaccine  A person who has ever had a life-threatening allergic reaction after a previous dose of any diphtheria, tetanus or pertussis containing vaccine, OR has a severe allergy to any part of this vaccine, should not get Tdap vaccine. Tell the person giving the vaccine about any severe allergies.  Anyone who had coma or long repeated seizures within 7 days after a childhood dose of DTP or DTaP, or a previous dose of Tdap, should not get Tdap, unless a cause other than the vaccine was found. They can still get Td.  Talk to your doctor if you:  have seizures or another nervous system problem,  had severe pain or swelling after any vaccine containing diphtheria, tetanus or pertussis,  ever had a condition called Guillain-Barré Syndrome (GBS),  aren't feeling well on the day the shot is scheduled.    4. Risks  With any medicine, including vaccines, there is a chance of side effects. These are usually mild and go away on their own. Serious reactions are also possible but are rare.  Most people who get Tdap vaccine do not have any problems with it.  Mild problems following Tdap  (Did not interfere with activities)  Pain where the shot was given (about 3 in 4 adolescents or  2 in 3 adults)  Redness or swelling where the shot was given (about 1 person in 5)  Mild fever of at least 100.4°F (up to about 1 in 25 adolescents or 1 in 100 adults)  Headache (about 3 or 4 people in 10)  Tiredness (about 1 person in 3 or 4)  Nausea, vomiting, diarrhea, stomach ache (up to 1 in 4 adolescents or 1 in 10 adults)  Chills, sore joints (about 1 person in 10)  Body aches (about 1 person in 3 or 4)  Rash, swollen glands (uncommon)  Moderate problems following Tdap  (Interfered with activities, but did not require medical attention)  Pain where the shot was given (up to 1 in 5 or 6)  Redness or swelling where the shot was given (up to about 1 in 16 adolescents or 1 in 12 adults)  Fever over 102°F (about 1 in 100 adolescents or 1 in 250 adults)  Headache (about 1 in 7 adolescents or 1 in 10 adults)  Nausea, vomiting, diarrhea, stomach ache (up to 1 or 3 people in 100)  Swelling of the entire arm where the shot was given (up to about 1 in 500).  Severe problems following Tdap  (Unable to perform usual activities; required medical attention)  Swelling, severe pain, bleeding and redness in the arm where the shot was given (rare).  Problems that could happen after any vaccine:  People sometimes faint after a medical procedure, including vaccination. Sitting or lying down for about 15 minutes can help prevent fainting, and injuries caused by a fall. Tell your doctor if you feel dizzy, or have vision changes or ringing in the ears.  Some people get severe pain in the shoulder and have difficulty moving the arm where a shot was given. This happens very rarely.  Any medication can cause a severe allergic reaction. Such reactions from a vaccine are very rare, estimated at fewer than 1 in a million doses, and would happen within a few minutes to a few hours after the vaccination.  As with any medicine, there is a very remote chance of a vaccine causing a serious injury or death.  The safety of vaccines is always  being monitored. For more information, visit: www.cdc.gov/vaccinesafety/    5. What if there is a serious problem?  What should I look for?  Look for anything that concerns you, such as signs of a severe allergic reaction, very high fever, or unusual behavior.  Signs of a severe allergic reaction can include hives, swelling of the face and throat, difficulty breathing, a fast heartbeat, dizziness, and weakness. These would usually start a few minutes to a few hours after the vaccination.  What should I do?  If you think it is a severe allergic reaction or other emergency that can't wait, call 9-1-1 or get the person to the nearest hospital. Otherwise, call your doctor.  Afterward, the reaction should be reported to the Vaccine Adverse Event Reporting System (VAERS). Your doctor might file this report, or you can do it yourself through the VAERS web site at www.vaers.hhs.gov, or by calling 1-173.263.6371.  VAERS does not give medical advice.    6. The National Vaccine Injury Compensation Program  The National Vaccine Injury Compensation Program (VICP) is a federal program that was created to compensate people who may have been injured by certain vaccines.  Persons who believe they may have been injured by a vaccine can learn about the program and about filing a claim by calling 1-300.213.2927 or visiting the VICP website at www.hrsa.gov/vaccinecompensation. There is a time limit to file a claim for compensation.    7. How can I learn more?  Ask your doctor. He or she can give you the vaccine package insert or suggest other sources of information.  Call your local or state health department.  Contact the Centers for Disease Control and Prevention (CDC):  Call 1-377.561.7221 (3-941-KLS-INFO) or  Visit CDC's website at www.cdc.gov/vaccines      Vaccine Information Statement Tdap Vaccine (2/24/2015)  This information is not intended to replace advice given to you by your health care provider. Make sure you discuss any  questions you have with your health care provider.  Document Released: 06/18/2013 Document Revised: 08/05/2019 Document Reviewed: 08/05/2019  [employer] Interactive Patient Education © 2019 [employer]          Zoster Vaccine, Recombinant injection (Shingrix)      What is this medicine?  ZOSTER VACCINE (ZOS ter vak SEEN) is used to prevent shingles in adults 50 years old and over. This vaccine is not used to treat shingles or nerve pain from shingles.  This medicine may be used for other purposes; ask your health care provider or pharmacist if you have questions.    What should I tell my health care provider before I take this medicine?  They need to know if you have any of these conditions:  blood disorders or disease  cancer like leukemia or lymphoma  immune system problems or therapy  an unusual or allergic reaction to vaccines, other medications, foods, dyes, or preservatives  pregnant or trying to get pregnant  breast-feeding    How should I use this medicine?  This vaccine is for injection in a muscle. It is given by a health care professional.  The vaccine series requires 2 doses for full effect  The second dose should be given somewhere between 2-6 months after the initial injection is given.    What if I miss a dose?  Keep appointments for follow-up (booster) doses as directed. It is important not to miss your dose.   Call your doctor or health care professional if you are unable to keep an appointment.    What may interact with this medicine?  medicines that suppress your immune system  medicines to treat cancer  steroid medicines like prednisone or cortisone    This list may not describe all possible interactions. Give your health care provider a list of all the medicines, herbs, non-prescription drugs, or dietary supplements you use. Also tell them if you smoke, drink alcohol, or use illegal drugs. Some items may interact with your medicine.    What should I watch for while using this medicine?  Visit  your doctor for regular check ups.  This vaccine, like all vaccines, may not fully protect everyone.    What side effects may I notice from receiving this medicine?  Side effects that you should report to your doctor or health care professional as soon as possible:  allergic reactions like skin rash, itching or hives, swelling of the face, lips, or tongue  breathing problems  Side effects that usually do not require medical attention (report these to your doctor or health care professional if they continue or are bothersome):  chills  headache  fever  nausea, vomiting  redness, warmth, pain, swelling or itching at site where injected  tiredness  This list may not describe all possible side effects. Call your doctor for medical advice about side effects. You may report side effects to FDA at 7-891-TOL-9418.    Where should I keep my medicine?  This vaccine is only given in a clinic, pharmacy, doctor's office, or other health care setting and will not be stored at home.  NOTE: This sheet is a summary. It may not cover all possible information. If you have questions about this medicine, talk to your doctor, pharmacist, or health care provider.  © 2019 [employer] (2018-07-30 13:20:30)

## 2025-04-10 NOTE — PROGRESS NOTES
Subjective   Chief Complaint   Patient presents with    Gynecologic Exam     Nely Singleton is a 75 y.o. year old  Medicare patient presenting to be seen in follow up regarding her vulvar itching and bone loss.  She uses the mid potency steroid periodically and overall the itching is well-controlled.  Continues on Prolia prescribed by primary care.  Her last bone density has been within the last couple of years.    This past year she has not been on hormone replacement therapy.  She has not had any vaginal bleeding in the last 12 months.   Menopausal symptoms are not present.    May be getting  this summer.    Previously shingles vaccination was recommended.  She does not remember that but still needs to get it done.    SEXUAL Hx:  She is not currently sexually active.  In the past year there she has not been sexually active.    Condoms are not needed because she is not sexually active.  She would not like to be screened for STD's at today's exam.  [place] is painful: n/a  HEALTH Hx:  She exercises regularly: yes.  She wears her seat belt: yes.  She has concerns about domestic violence: no.  She has noticed changes in height: no    The following portions of the patient's history were reviewed and updated as appropriate:problem list, current medications, allergies, past family history, past medical history, past social history, and past surgical history.    Social History    Tobacco Use      Smoking status: Never      Smokeless tobacco: Never    Review of Systems  Constitutional POS: nothing reported    NEG: anorexia or night sweats   Genitourinary POS: nothing reported    NEG: dysuria or hematuria   Gastointestinal POS: nothing reported    NEG: bloating, change in bowel habits, melena, or reflux symptoms   Integument POS: nothing reported    NEG: moles that are changing in size, shape, color or rashes   Breast POS: nothing reported    NEG: persistent breast lump, skin dimpling, or nipple discharge         Objective   /76   Resp 14   Wt 69.9 kg (154 lb)   LMP  (LMP Unknown)   BMI 29.10 kg/m²     General:  well developed; well nourished  no acute distress  mentation appropriate   Skin:  No suspicious lesions seen   Thyroid: normal to inspection and palpation   Breasts:  Examined in supine position  Symmetric without masses or skin dimpling  Nipples normal without inversion, lesions or discharge  There are no palpable axillary nodes   Abdomen: soft, non-tender; no masses  no umbilical or inguinal hernias are present  no hepato-splenomegaly   Pelvis: Clinical staff was present for exam  External genitalia:  normal appearance of the external genitalia including Bartholin's and Zachary's glands.  :  urethral meatus normal; urethra normal:  Vaginal:  atrophic mucosal changes are present;  Cervix:  normal appearance.  Uterus:  normal size, shape and consistency.  Adnexa:  non palpable bilaterally.  Rectal:  digital rectal exam not performed; anus visually normal appearing.        Assessment   History of vulvar pruritus stable with medicine  Osteoporosis followed by primary care and on Prolia.  Normal GYN exam in menopause  Menopausal female currently not on HRT - without significant symptoms affecting activities of daily living  She is up to date on all relevant gynecologic and colorectal screenings       Plan   Pap was not done today.  I explained to Nely that the Pap smears are no longer recommended in patient's after 65 years of age.   I stressed to Nely that she still should be seen to be seen yearly for a full physical including breast and pelvic exam.  She was encouraged to get yearly mammograms.  She should report any palpable breast lump(s) or skin changes regardless of mammographic findings.  I explained to Nely that notification regarding her mammogram results will come from the center performing the study.  Our office will not be routinely calling with mammogram results.  It is her  responsibility to make sure that the results from the mammogram are communicated to her by the breast center.  If she has any questions about the results, she is welcome to call our office anytime.  The following data needs to be obtained to update her medical records: last DEXA and last colonoscopy.  I have counseled the patient on the importance of staying up to date with preventive vaccines as well as the risks and benefits of these vaccines.  Her vaccine record was reviewed and updated.  I discussed with Nely that she may be behind on needed vaccinations for TDAP and Shingles [Shingrix].  She may be able to obtain these vaccinations at her local pharmacy OR speak about obtaining them with her primary care.  If she does obtain her vaccines, I have asked Nely to let us know the date each vaccine was obtained so that her medical record could be updated in our system.  The importance of keeping all planned follow-up and taking all medications as prescribed was emphasized.  Follow up for annual exam 1 year    New Medications Ordered This Visit   Medications    triamcinolone (KENALOG) 0.1 % ointment     Sig: Apply  topically to the appropriate area as directed 2 (Two) Times a Day.     Dispense:  80 g     Refill:  3              This note was electronically signed.    Partha Mitchell M.D.  April 10, 2025    Part of this note may be an electronic transcription/translation of spoken language to printed text using the Dragon Dictation System.